# Patient Record
Sex: MALE | Race: OTHER | Employment: UNEMPLOYED | URBAN - METROPOLITAN AREA
[De-identification: names, ages, dates, MRNs, and addresses within clinical notes are randomized per-mention and may not be internally consistent; named-entity substitution may affect disease eponyms.]

---

## 2021-12-09 ENCOUNTER — OFFICE VISIT (OUTPATIENT)
Dept: FAMILY MEDICINE CLINIC | Age: 61
End: 2021-12-09

## 2021-12-09 ENCOUNTER — HOSPITAL ENCOUNTER (OUTPATIENT)
Dept: LAB | Age: 61
Discharge: HOME | End: 2021-12-09

## 2021-12-09 ENCOUNTER — TRANSCRIBE ORDER (OUTPATIENT)
Dept: SCHEDULING | Age: 61
End: 2021-12-09

## 2021-12-09 VITALS
HEIGHT: 60 IN | BODY MASS INDEX: 30.35 KG/M2 | WEIGHT: 154.6 LBS | SYSTOLIC BLOOD PRESSURE: 119 MMHG | RESPIRATION RATE: 16 BRPM | OXYGEN SATURATION: 98 % | DIASTOLIC BLOOD PRESSURE: 76 MMHG | HEART RATE: 65 BPM | TEMPERATURE: 98 F

## 2021-12-09 DIAGNOSIS — R14.0 ABDOMINAL BLOATING: Primary | ICD-10-CM

## 2021-12-09 DIAGNOSIS — Z71.89 COUNSELING AND COORDINATION OF CARE: Primary | ICD-10-CM

## 2021-12-09 DIAGNOSIS — R10.84 GENERALIZED ABDOMINAL PAIN: ICD-10-CM

## 2021-12-09 DIAGNOSIS — Z23 NEEDS FLU SHOT: ICD-10-CM

## 2021-12-09 PROCEDURE — 85027 COMPLETE CBC AUTOMATED: CPT

## 2021-12-09 PROCEDURE — 90471 IMMUNIZATION ADMIN: CPT

## 2021-12-09 PROCEDURE — 80053 COMPREHEN METABOLIC PANEL: CPT

## 2021-12-09 PROCEDURE — 85652 RBC SED RATE AUTOMATED: CPT

## 2021-12-09 PROCEDURE — 84443 ASSAY THYROID STIM HORMONE: CPT

## 2021-12-09 PROCEDURE — 99080 SPECIAL REPORTS OR FORMS: CPT | Performed by: FAMILY MEDICINE

## 2021-12-09 PROCEDURE — 99204 OFFICE O/P NEW MOD 45 MIN: CPT | Performed by: FAMILY MEDICINE

## 2021-12-09 PROCEDURE — 90686 IIV4 VACC NO PRSV 0.5 ML IM: CPT

## 2021-12-09 PROCEDURE — 83690 ASSAY OF LIPASE: CPT

## 2021-12-09 NOTE — PROGRESS NOTES
[address] [facility] Kayley (: 1960) is a 64 y.o. male, new patient, here for evaluation of the following chief complaint(s):  Establish Care and Abdominal Pain (Patient c/o with abdomial bloating and pain after the surgery. ( surgery was done in [facility],[address]))       ASSESSMENT/PLAN:  1. Abdominal bloating  PostOp. Will get follow up CT as recommended by surgery. Consider adhesions, infection. Will also check labs. Start Probiotics and fiber supplements. 2. Generalized abdominal pain  -     CBC W/O DIFF; Future  -     METABOLIC PANEL, COMPREHENSIVE; Future  -     SED RATE (ESR); Future  -     TSH 3RD GENERATION; Future  -     LIPASE; Future  -     CT ABD W CONT; Future  -     CT PELV W CONT; Future  3. Needs flu shot  -     INFLUENZA VIRUS VAC QUAD,SPLIT,PRESV FREE SYRINGE IM      Follow-up and Dispositions    · Return in about 4 weeks (around 2022) for follow up for F2F in 4 weeks for abdominal bloating. SUBJECTIVE:  HPI  Patient presents with daughter-in-law who translates for [employer].  Abdominal Pain/Bloating:  Patient had appendectomy and partial cecectomy 2021 at [facility]. Since that time he has had abdominal bloating and pain after eating on a daily basis. Last post op visit to surgery was 10/25/21 during which surgery recommended a CT scan which was never done, per pt, due to cost.  No vomiting, diarrhea. Has occasional constipation which is worse since surgery. PMHx:  No prior chronic medical illness. Review of Systems   Constitutional: Positive for unexpected weight change. Negative for appetite change, chills and fever. Gastrointestinal: Positive for abdominal distention, abdominal pain and constipation. Negative for blood in stool, diarrhea, nausea and vomiting. OBJECTIVE:  Blood pressure 119/76, pulse 65, temperature 98 °F (36.7 °C), temperature source Temporal, resp. rate 16, height 4' 10.66\" (1.49 m), weight 154 lb 9.6 oz (70.1 kg), SpO2 98 %.   Physical Exam  CONSTITUTIONAL:  Well developed. No apparent distress. PSYCHIATRIC: Oriented to time, place, person & situation. Appropriate mood and affect. NECK:  Normal inspection, normal palpation without any lymphadenopathy, masses, or thyromegaly  CARDIOVASCULAR:  Regular rate and rhythm. Normal S1, S2. No extra sounds. RESPIRATORY:  Normal effort. Normal ascultation without wheezing. ABDOMEN:  Normal bowel sounds. Abdomen soft, non tender. No hepatosplenomegaly or masses. VASCULAR:  Normal Carotid pulses without bruits. Normal Posterior Tibialis pulses. No abdominal or femoral bruits. EXTREMITIES:  No edema. No results found for this visit on 12/09/21. An electronic signature was used to authenticate this note.   -- Ivana Velazquez MD

## 2021-12-09 NOTE — PROGRESS NOTES
Kellie Elder is a 64 y.o. male  Chief Complaint   Patient presents with    Establish Care    Abdominal Pain     Patient c/o with abdomial bloating and pain after the surgery. ( surgery was done in [facility])     Blood pressure 119/76, pulse 65, temperature 98 °F (36.7 °C), temperature source Temporal, resp. rate 16, height 4' 10.66\" (1.49 m), weight 154 lb 9.6 oz (70.1 kg), SpO2 98 %. 1. Have you been to the ER, urgent care clinic since your last visit? Hospitalized since your last visit? No    2. Have you seen or consulted any other health care providers outside of the [address] [place] [address] since your last visit? Include any pap smears or colon screening.  No

## 2021-12-09 NOTE — PATIENT INSTRUCTIONS
Use suplementos de fibra prasad vez diaria. Ejemplos son Benefiber. Use Probioticos prasad vez diaria marleni Culturelle, Florastor,  o otras combinaciones en tiendas naturales.

## 2021-12-09 NOTE — PROGRESS NOTES
An After Visit Summary was printed and given to the patient. Patient advised to take probiotics and fiber supplements. A picture was given to them to facilitate finding of these OTC medications. Patient has been referred for CT scan per providers order. This nurse assisted in the making of the appointment which will be Dec. 15 th 2021 at [address] [place] [address]. Requests flu vaccine; denies fever, egg allergy. Immunization given per protocol and recorded in [address] [place]. VIS information sheet given, explained possible S/E. Reviewed sx indicating need to be seen in ER. Pt had no adverse reaction at time of discharge.     I have reviewed the provider's instructions with the patient, answering all questions to his satisfaction  Patric Dowd RN

## 2021-12-09 NOTE — PROGRESS NOTES
Assisted patient with questions he had about hospital bills. Patient has been scheduled at the hospital for a procedure. OW advised patient to call when he receives the bills. OW provided her business card for patient to call. Patient verbalized understanding.

## 2021-12-10 LAB
ALBUMIN SERPL-MCNC: 3.9 G/DL (ref 3.5–5)
ALBUMIN/GLOB SERPL: 1 {RATIO} (ref 1.1–2.2)
ALP SERPL-CCNC: 167 U/L (ref 45–117)
ALT SERPL-CCNC: 56 U/L (ref 12–78)
ANION GAP SERPL CALC-SCNC: 6 MMOL/L (ref 5–15)
AST SERPL-CCNC: 36 U/L (ref 15–37)
BILIRUB SERPL-MCNC: 0.4 MG/DL (ref 0.2–1)
BUN SERPL-MCNC: 11 MG/DL (ref 6–20)
BUN/CREAT SERPL: 15 (ref 12–20)
CALCIUM SERPL-MCNC: 9 MG/DL (ref 8.5–10.1)
CHLORIDE SERPL-SCNC: 105 MMOL/L (ref 97–108)
CO2 SERPL-SCNC: 26 MMOL/L (ref 21–32)
CREAT SERPL-MCNC: 0.72 MG/DL (ref 0.7–1.3)
ERYTHROCYTE [DISTWIDTH] IN BLOOD BY AUTOMATED COUNT: 14.4 % (ref 11.5–14.5)
ERYTHROCYTE [SEDIMENTATION RATE] IN BLOOD: 8 MM/HR (ref 0–20)
GLOBULIN SER CALC-MCNC: 3.8 G/DL (ref 2–4)
GLUCOSE SERPL-MCNC: 110 MG/DL (ref 65–100)
HCT VFR BLD AUTO: 43.3 % (ref 36.6–50.3)
HGB BLD-MCNC: 14.3 G/DL (ref 12.1–17)
LIPASE SERPL-CCNC: 100 U/L (ref 73–393)
MCH RBC QN AUTO: 30.5 PG (ref 26–34)
MCHC RBC AUTO-ENTMCNC: 33 G/DL (ref 30–36.5)
MCV RBC AUTO: 92.3 FL (ref 80–99)
NRBC # BLD: 0 K/UL (ref 0–0.01)
NRBC BLD-RTO: 0 PER 100 WBC
PLATELET # BLD AUTO: 284 K/UL (ref 150–400)
PMV BLD AUTO: 10.1 FL (ref 8.9–12.9)
POTASSIUM SERPL-SCNC: 4.1 MMOL/L (ref 3.5–5.1)
PROT SERPL-MCNC: 7.7 G/DL (ref 6.4–8.2)
RBC # BLD AUTO: 4.69 M/UL (ref 4.1–5.7)
SODIUM SERPL-SCNC: 137 MMOL/L (ref 136–145)
TSH SERPL DL<=0.05 MIU/L-ACNC: 2.47 UIU/ML (ref 0.36–3.74)
WBC # BLD AUTO: 6.5 K/UL (ref 4.1–11.1)

## 2021-12-15 ENCOUNTER — HOSPITAL ENCOUNTER (OUTPATIENT)
Dept: CT IMAGING | Age: 61
Discharge: HOME | End: 2021-12-15
Attending: FAMILY MEDICINE

## 2021-12-15 DIAGNOSIS — R14.0 ABDOMINAL BLOATING: ICD-10-CM

## 2021-12-15 PROCEDURE — 74177 CT ABD & PELVIS W/CONTRAST: CPT

## 2021-12-15 PROCEDURE — 74011000636 HC RX REV CODE- 636: Performed by: FAMILY MEDICINE

## 2021-12-15 RX ADMIN — IOPAMIDOL 100 ML: 755 INJECTION, SOLUTION INTRAVENOUS at 16:21

## 2021-12-17 NOTE — PROGRESS NOTES
Called and spoke with Josey Russell and informed of CT scan results Per Dr Jose Luis Chau:   His CT of abdomen is normal.  NO infection causing his problem. Advised patient to schedule appointment if needed.

## 2021-12-27 ENCOUNTER — TELEPHONE (OUTPATIENT)
Dept: FAMILY MEDICINE CLINIC | Age: 61
End: 2021-12-27

## 2021-12-27 NOTE — TELEPHONE ENCOUNTER
CT of Pelvis was completed and results communicated. Do you want to keep the CT of ABD open?  Pt has not received this test.

## 2021-12-29 NOTE — PROGRESS NOTES
I reviewed the patient's chart and noted that the CT results were posted and reviewed by the provider. The results were called to the patient.  Romayne Leyland, RN

## 2022-01-13 ENCOUNTER — OFFICE VISIT (OUTPATIENT)
Dept: FAMILY MEDICINE CLINIC | Age: 62
End: 2022-01-13

## 2022-01-13 VITALS
SYSTOLIC BLOOD PRESSURE: 107 MMHG | HEIGHT: 60 IN | WEIGHT: 156 LBS | OXYGEN SATURATION: 96 % | TEMPERATURE: 98.1 F | HEART RATE: 73 BPM | BODY MASS INDEX: 30.63 KG/M2 | DIASTOLIC BLOOD PRESSURE: 68 MMHG

## 2022-01-13 DIAGNOSIS — M25.512 ACUTE PAIN OF LEFT SHOULDER: ICD-10-CM

## 2022-01-13 DIAGNOSIS — R14.0 ABDOMINAL BLOATING: Primary | ICD-10-CM

## 2022-01-13 PROCEDURE — 99214 OFFICE O/P EST MOD 30 MIN: CPT | Performed by: FAMILY MEDICINE

## 2022-01-13 RX ORDER — CELECOXIB 200 MG/1
200 CAPSULE ORAL DAILY
Qty: 14 CAPSULE | Refills: 0 | Status: SHIPPED | OUTPATIENT
Start: 2022-01-13

## 2022-01-13 RX ORDER — MELATONIN
1000 DAILY
Qty: 30 TABLET | Refills: 1 | Status: SHIPPED | OUTPATIENT
Start: 2022-01-13

## 2022-01-13 RX ORDER — LACTOBACILLUS RHAMNOSUS GG 10B CELL
1 CAPSULE ORAL DAILY
Qty: 30 CAPSULE | Refills: 2 | Status: SHIPPED | OUTPATIENT
Start: 2022-01-13

## 2022-01-13 RX ORDER — POLYETHYLENE GLYCOL 3350 17 G/17G
POWDER, FOR SOLUTION ORAL
COMMUNITY
Start: 2021-06-18 | End: 2022-06-18

## 2022-01-13 NOTE — PROGRESS NOTES
An After Visit Summary was printed and given to the patient. GoodRx provided to patient for needed medications. Instructed patient on how to use the coupon/card.

## 2022-01-13 NOTE — PROGRESS NOTES
[address] [facility] Kayley (: 1960) is a 64 y.o. male, established patient, here for evaluation of the following chief complaint(s):  Bloated (f/up), Medication Refill (Pt bought it OTC recommended by the Dr and the pt wants to [facility] if he can countinuing taking), and Arm Pain (pt c/o left arm pain. Pt states he went to [facility] to do a CT scan test and he still as pain on arm)       ASSESSMENT/PLAN:  1. Abdominal bloating  PostOp resolved. Continue with Miralax and Probiotics. 2. Acute pain of left shoulder  Impingement, start Celebrex for 2 weeks. With some other generalized aches will start Vitamin D supplement for the winter. Follow up if not improved in 2 weeks. SUBJECTIVE:  HPI  Patient presents with daughter-in-law who translates for [employer].  Abdominal Pain/Bloating:  Appendectomy and partial cecectomy 2021 at [facility]. PostOp  abdominal bloating and pain after eating on a daily basis. Since starting Probiotics and Miralax his abdominal bloating has resolved. Reviewed labs and CT which showed no acute process. Lt shoulder pain:  Patient started with Lt shoulder pain 1 month ago. NO injury. Pain with movement. Now starting to have Rt shoulder pain. Denies weakness or paresthesias. PMHx:  No prior chronic medical illness. Review of Systems   Constitutional: Negative for chills, diaphoresis, fatigue, fever and unexpected weight change. Respiratory: Negative for cough. Cardiovascular: Negative for chest pain. Gastrointestinal: Negative for abdominal distention and abdominal pain. Musculoskeletal: Positive for myalgias. Negative for gait problem and joint swelling. OBJECTIVE:  Blood pressure 107/68, pulse 73, temperature 98.1 °F (36.7 °C), temperature source Temporal, height 4' 10.47\" (1.485 m), weight 156 lb (70.8 kg), SpO2 96 %. Physical Exam  CONSTITUTIONAL:  Well developed. No apparent distress. PSYCHIATRIC: Oriented to time, place, person & situation. Appropriate mood and affect. CARDIOVASCULAR:  Regular rate and rhythm. Normal S1, S2. No extra sounds. RESPIRATORY:  Normal effort. Normal ascultation without wheezing. ABDOMEN:  Normal bowel sounds. Abdomen soft, non tender. No hepatosplenomegaly or masses. EXTREMITIES:  No edema. MUSCULOSKELETAL:  Rt shoulder with good ROM without any pain or weakness. Lt shoulder with pain at 120deg of abduction.  + [place]. No weakness. No results found for this visit on 01/13/22. An electronic signature was used to authenticate this note.   -- Chris Ramon MD

## 2022-01-13 NOTE — PROGRESS NOTES
Coordination of Care  1. Have you been to the ER, urgent care clinic since your last visit? Hospitalized since your last visit? No    2. Have you seen or consulted any other health care providers outside of the [address] [place] [address] since your last visit? Include any pap smears or colon screening. No    Does the patient need refills? YES    Learning Assessment Complete?  yes  Depression Screening complete in the past 12 months? yes

## 2023-05-15 RX ORDER — CELECOXIB 200 MG/1
200 CAPSULE ORAL DAILY
COMMUNITY
Start: 2022-01-13

## 2024-02-06 ENCOUNTER — TRANSCRIBE ORDERS (OUTPATIENT)
Facility: HOSPITAL | Age: 64
End: 2024-02-06

## 2024-02-06 DIAGNOSIS — R10.30 LOWER ABDOMINAL PAIN: Primary | ICD-10-CM

## 2024-02-22 ENCOUNTER — HOSPITAL ENCOUNTER (OUTPATIENT)
Facility: HOSPITAL | Age: 64
Discharge: HOME | End: 2024-02-25

## 2024-02-22 DIAGNOSIS — R10.30 LOWER ABDOMINAL PAIN: ICD-10-CM

## 2024-02-22 PROCEDURE — 74176 CT ABD & PELVIS W/O CONTRAST: CPT
